# Patient Record
Sex: MALE | Race: WHITE | Employment: OTHER | URBAN - METROPOLITAN AREA
[De-identification: names, ages, dates, MRNs, and addresses within clinical notes are randomized per-mention and may not be internally consistent; named-entity substitution may affect disease eponyms.]

---

## 2017-01-20 ENCOUNTER — TELEPHONE (OUTPATIENT)
Dept: RHEUMATOLOGY | Facility: CLINIC | Age: 57
End: 2017-01-20

## 2017-01-20 NOTE — TELEPHONE ENCOUNTER
Pt stts he dropped off forms in the office for the Doc to fill out   Pt stts it is for his insurance company [employer] has not received those forms as of yet   Pt would like a call back to further discuss, forms were dropped in Dec

## 2017-01-23 ENCOUNTER — TELEPHONE (OUTPATIENT)
Dept: ADMINISTRATIVE | Age: 57
End: 2017-01-23

## 2017-01-23 NOTE — TELEPHONE ENCOUNTER
Dr. Dorita Randall,  Pt has dropped off long term disability forms. May I please extend his disability X 12 more months?   Please advise, thanks Ophelia

## 2017-02-16 ENCOUNTER — TELEPHONE (OUTPATIENT)
Dept: RHEUMATOLOGY | Facility: CLINIC | Age: 57
End: 2017-02-16

## 2017-02-16 DIAGNOSIS — M05.79 SEROPOSITIVE RHEUMATOID ARTHRITIS OF MULTIPLE SITES (HCC): Primary | ICD-10-CM

## 2017-02-16 DIAGNOSIS — Z51.81 ENCOUNTER FOR THERAPEUTIC DRUG MONITORING: ICD-10-CM

## 2017-02-16 NOTE — TELEPHONE ENCOUNTER
Per pt, he is planning to go and have blood work this wkend but the Order on file will be ,  Pls advise.

## 2017-03-22 ENCOUNTER — OFFICE VISIT (OUTPATIENT)
Dept: RHEUMATOLOGY | Facility: CLINIC | Age: 57
End: 2017-03-22

## 2017-03-22 VITALS
HEIGHT: 70 IN | BODY MASS INDEX: 31.87 KG/M2 | DIASTOLIC BLOOD PRESSURE: 80 MMHG | SYSTOLIC BLOOD PRESSURE: 127 MMHG | HEART RATE: 71 BPM | WEIGHT: 222.63 LBS

## 2017-03-22 DIAGNOSIS — Z51.81 THERAPEUTIC DRUG MONITORING: ICD-10-CM

## 2017-03-22 DIAGNOSIS — M05.79 SEROPOSITIVE RHEUMATOID ARTHRITIS OF MULTIPLE SITES (HCC): Primary | ICD-10-CM

## 2017-03-22 PROCEDURE — G0463 HOSPITAL OUTPT CLINIC VISIT: HCPCS | Performed by: INTERNAL MEDICINE

## 2017-03-22 PROCEDURE — 99214 OFFICE O/P EST MOD 30 MIN: CPT | Performed by: INTERNAL MEDICINE

## 2017-03-22 NOTE — PROGRESS NOTES
HPI:    Patient ID: Amadou Hussein is a 56year old male. HPI Comments: Julio Gonzales is a 53-year-old gentleman with CCP and RF positive rheumatoid arthritis.  Since I saw him 4 months ago he has continued methylprednisolone 4 mg daily, Naprosyn 500 mg once or twic 500 MG Oral Tab TAKE 1 TABLET TWICE A DAY  WITH FOOD (Patient taking differently: TAKE 1 TABLET DAILY) Disp: 180 tablet Rfl: 3   Coenzyme Q10 (CO Q 10 OR) Take 1 tablet by mouth daily.  Disp:  Rfl:    Cholecalciferol (VITAMIN D) 2000 UNITS Oral Cap Take  by Sig: TAKE 8 TABLETS EVERY WEEK           Imaging & Referrals:  None       VW#[id number]

## 2017-06-06 RX ORDER — NAPROXEN 500 MG/1
TABLET ORAL
Qty: 180 TABLET | Refills: 3 | Status: SHIPPED | OUTPATIENT
Start: 2017-06-06 | End: 2018-07-30 | Stop reason: ALTCHOICE

## 2017-06-06 RX ORDER — FOLIC ACID 1 MG/1
TABLET ORAL
Qty: 90 TABLET | Refills: 3 | Status: SHIPPED | OUTPATIENT
Start: 2017-06-06 | End: 2018-08-03

## 2017-06-06 NOTE — TELEPHONE ENCOUNTER
LOV: 3/22/17  Last Refilled:Naproxen#180, 3rfs 7/5/98 and Folic CDMJ#81, 3rfs 30/54/91  Labs:CRT 0.8 9/24/16      No future appointments. Please advise.

## 2017-08-02 ENCOUNTER — OFFICE VISIT (OUTPATIENT)
Dept: RHEUMATOLOGY | Facility: CLINIC | Age: 57
End: 2017-08-02

## 2017-08-02 VITALS
BODY MASS INDEX: 31.64 KG/M2 | SYSTOLIC BLOOD PRESSURE: 116 MMHG | HEART RATE: 73 BPM | HEIGHT: 70 IN | WEIGHT: 221 LBS | DIASTOLIC BLOOD PRESSURE: 75 MMHG

## 2017-08-02 DIAGNOSIS — M05.79 SEROPOSITIVE RHEUMATOID ARTHRITIS OF MULTIPLE SITES (HCC): Primary | ICD-10-CM

## 2017-08-02 DIAGNOSIS — Z51.81 THERAPEUTIC DRUG MONITORING: ICD-10-CM

## 2017-08-02 PROCEDURE — G0463 HOSPITAL OUTPT CLINIC VISIT: HCPCS | Performed by: INTERNAL MEDICINE

## 2017-08-02 PROCEDURE — 99214 OFFICE O/P EST MOD 30 MIN: CPT | Performed by: INTERNAL MEDICINE

## 2017-08-02 NOTE — PROGRESS NOTES
HPI:    Patient ID: Hortencia Berrios is a 56year old male. Joaquin Vilchis is a 61-year-old gentleman with CCP and RF positive rheumatoid arthritis.  Since I saw him 4 months ago he has continued methylprednisolone 4 mg daily, Naprosyn 500 mg once or twice a day, metho Take 1 tablet by mouth daily. Disp:  Rfl:    Coenzyme Q10 (CO Q 10 OR) Take 1 tablet by mouth daily. Disp:  Rfl:    Cholecalciferol (VITAMIN D) 2000 UNITS Oral Cap Take  by mouth. Take 1 tab.  Every day Disp:  Rfl:    POTASSIUM CITRATE OR Take 99 Mutually D methotrexate 2.5 MG Oral Tab 104 tablet 1      Sig: TAKE 8 TABLETS EVERY WEEK           Imaging & Referrals:  None       OO#[id number]

## 2017-11-06 RX ORDER — METHYLPREDNISOLONE 4 MG/1
TABLET ORAL
Qty: 90 TABLET | Refills: 3 | Status: SHIPPED | OUTPATIENT
Start: 2017-11-06 | End: 2018-07-30

## 2017-11-20 NOTE — PROGRESS NOTES
Trixie Singh is a 72-year-old gentleman with CCP and RF positive rheumatoid arthritis.  Since I saw him almost 4 months ago, he has continued methylprednisolone 4 mg daily, Naprosyn 500 mg once or twice a day, methotrexate 20 mg weekly, and hydroxychloroquine 400 m 180 tablet Rfl: 3   methylPREDNISolone 4 MG Oral Tab Take one daily. Disp: 90 tablet Rfl: 3   Olmesartan Medoxomil-HCTZ 40-25 MG Oral Tab Take 1 tablet by mouth daily. Disp:  Rfl:    Coenzyme Q10 (CO Q 10 OR) Take 1 tablet by mouth daily.  Disp:  Rfl:    Ch a biologic agent. He is phobic of them. 2. Encounter for therapeutic drug monitoring. 3.  Flare of right shoulder rotator cuff tendinitis.   After informed consent was obtained, his right shoulder was injected into the subacromial area with 40 mg of Dep

## 2017-11-21 ENCOUNTER — OFFICE VISIT (OUTPATIENT)
Dept: RHEUMATOLOGY | Facility: CLINIC | Age: 57
End: 2017-11-21

## 2017-11-21 VITALS
BODY MASS INDEX: 32.18 KG/M2 | TEMPERATURE: 98 F | WEIGHT: 224.81 LBS | HEART RATE: 61 BPM | HEIGHT: 70 IN | SYSTOLIC BLOOD PRESSURE: 154 MMHG | DIASTOLIC BLOOD PRESSURE: 98 MMHG

## 2017-11-21 DIAGNOSIS — M05.79 SEROPOSITIVE RHEUMATOID ARTHRITIS OF MULTIPLE SITES (HCC): Primary | ICD-10-CM

## 2017-11-21 DIAGNOSIS — M67.911 DISORDER OF RIGHT ROTATOR CUFF: ICD-10-CM

## 2017-11-21 DIAGNOSIS — Z51.81 THERAPEUTIC DRUG MONITORING: ICD-10-CM

## 2017-11-21 PROCEDURE — 99214 OFFICE O/P EST MOD 30 MIN: CPT | Performed by: INTERNAL MEDICINE

## 2017-11-21 PROCEDURE — 20610 DRAIN/INJ JOINT/BURSA W/O US: CPT | Performed by: INTERNAL MEDICINE

## 2017-11-21 RX ORDER — METHYLPREDNISOLONE ACETATE 40 MG/ML
40 INJECTION, SUSPENSION INTRA-ARTICULAR; INTRALESIONAL; INTRAMUSCULAR; SOFT TISSUE ONCE
Status: COMPLETED | OUTPATIENT
Start: 2017-11-21 | End: 2017-11-21

## 2017-11-21 RX ORDER — HYDROXYCHLOROQUINE SULFATE 200 MG/1
400 TABLET, FILM COATED ORAL
Qty: 180 TABLET | Refills: 3 | Status: SHIPPED | OUTPATIENT
Start: 2017-11-21 | End: 2018-10-09

## 2017-11-21 NOTE — PROCEDURES
Joint Injection  Pre-procedure care:  Consent was obtained, Procedure/risks were explained, Questions were answered, Patient was prepped and draped in the usual sterile fashion, Correct side and site confirmed and Correct patient identified  Seropositive r

## 2017-11-28 ENCOUNTER — TELEPHONE (OUTPATIENT)
Dept: RHEUMATOLOGY | Facility: CLINIC | Age: 57
End: 2017-11-28

## 2017-11-28 ENCOUNTER — PATIENT MESSAGE (OUTPATIENT)
Dept: RHEUMATOLOGY | Facility: CLINIC | Age: 57
End: 2017-11-28

## 2017-11-28 RX ORDER — METHYLPREDNISOLONE 4 MG/1
TABLET ORAL
Qty: 1 PACKAGE | Refills: 0 | Status: SHIPPED | OUTPATIENT
Start: 2017-11-28 | End: 2018-04-11

## 2017-11-28 RX ORDER — FOLIC ACID 1 MG/1
TABLET ORAL
Qty: 90 TABLET | Refills: 3 | OUTPATIENT
Start: 2017-11-28

## 2017-11-28 NOTE — TELEPHONE ENCOUNTER
From: Rhett Torres  To: Key Lott MD  Sent: 11/28/2017 6:31 AM CST  Subject: Non-Urgent Medical Question    Good morning, still having pain in right shoulder. Do I have any other options.  Thanks Dannie Morse

## 2017-11-28 NOTE — TELEPHONE ENCOUNTER
AKB:88-09  Last Filled:6-6-17, #90 with 3 refills  Labs:  No future appointments. [facility] and spoke to Lali Caraballo, patient has refills left.   Disregard request.

## 2017-11-28 NOTE — TELEPHONE ENCOUNTER
Phoned patient regarding a refill request and he reports pain to his right shoulder. Pain is at 9 and it radiates up to his neck and down the arm. The injection Dr. Mary Reilly gave him did not help. He wants to know what he can do for the pain? Please advise.

## 2017-11-28 NOTE — TELEPHONE ENCOUNTER
I discussed this with Tanya Stokes. Unfortunately, his shoulder is no better after the injection 1 week ago. It makes it hard to sleep. I sent in a prescription for Medrol Dosepak.   If he is not better after a few days, he will come in for the right shoulder

## 2018-01-02 ENCOUNTER — TELEPHONE (OUTPATIENT)
Dept: RHEUMATOLOGY | Facility: CLINIC | Age: 58
End: 2018-01-02

## 2018-01-02 NOTE — TELEPHONE ENCOUNTER
Pt stop by Renetta Isaacs office requesting to have [facility] forms filled out & have copies mailed to him. Form was placed in Dr. Dee Dee Ramirez. Please advise.

## 2018-01-03 NOTE — TELEPHONE ENCOUNTER
Dr. Antony Rod,    Please sign off on form:  -Highlight patient  -Hit Chart button  -Open Telephone encounter 1/2/18  -Scroll to [facility] Disab Dr. Antony Rod [birth year] and click to open image.  -Hit ACKNOWLEDGE button at the bottom right corner and l

## 2018-01-03 NOTE — TELEPHONE ENCOUNTER
Dr. Shawna Kirkland,  Pt has dropped off long term disability forms. May I please extend his disability X 12 more months? Please advise.     Thank you,  [facility]

## 2018-01-03 NOTE — TELEPHONE ENCOUNTER
Disability form for Dr. Sher Ge received in [employer]. Logged for processing. [employer] packet emailed to pt [email or web address]).  NK

## 2018-02-01 ENCOUNTER — TELEPHONE (OUTPATIENT)
Dept: RHEUMATOLOGY | Facility: CLINIC | Age: 58
End: 2018-02-01

## 2018-02-01 DIAGNOSIS — M25.511 CHRONIC RIGHT SHOULDER PAIN: Primary | ICD-10-CM

## 2018-02-01 DIAGNOSIS — G89.29 CHRONIC RIGHT SHOULDER PAIN: Primary | ICD-10-CM

## 2018-02-02 ENCOUNTER — HOSPITAL ENCOUNTER (OUTPATIENT)
Dept: GENERAL RADIOLOGY | Age: 58
Discharge: HOME | End: 2018-02-02
Attending: INTERNAL MEDICINE
Payer: MEDICARE

## 2018-02-02 DIAGNOSIS — M25.511 CHRONIC RIGHT SHOULDER PAIN: ICD-10-CM

## 2018-02-02 DIAGNOSIS — G89.29 CHRONIC RIGHT SHOULDER PAIN: ICD-10-CM

## 2018-02-02 PROCEDURE — 73030 X-RAY EXAM OF SHOULDER: CPT | Performed by: INTERNAL MEDICINE

## 2018-02-06 ENCOUNTER — TELEPHONE (OUTPATIENT)
Dept: RHEUMATOLOGY | Facility: CLINIC | Age: 58
End: 2018-02-06

## 2018-02-06 DIAGNOSIS — M25.511 CHRONIC RIGHT SHOULDER PAIN: Primary | ICD-10-CM

## 2018-02-06 DIAGNOSIS — G89.29 CHRONIC RIGHT SHOULDER PAIN: Primary | ICD-10-CM

## 2018-02-06 NOTE — TELEPHONE ENCOUNTER
Please let him know that his main right shoulder joint, the glenohumeral joint is normal.    There is significant osteoarthritis, with spurring, in his acromioclavicular joint, where the collarbone meets the shoulder.   The spurs can irritate the tendons, a

## 2018-02-07 NOTE — TELEPHONE ENCOUNTER
Pt aware of results and verbalized understanding. Asking if there is something he can do for the pain? Started to suggest Tylenol but pt stated he already takes Naproxen 1,000 mg daily. Please advise.  Pt stated he will have labs completed soon and schedule

## 2018-02-07 NOTE — TELEPHONE ENCOUNTER
I recommend physical therapy. I injected the shoulder at his last visit. If agreeable, please order physical therapy for 12 visits. Thanks.

## 2018-03-07 ENCOUNTER — TELEPHONE (OUTPATIENT)
Dept: RHEUMATOLOGY | Facility: CLINIC | Age: 58
End: 2018-03-07

## 2018-03-07 DIAGNOSIS — Z51.81 ENCOUNTER FOR THERAPEUTIC DRUG MONITORING: ICD-10-CM

## 2018-03-07 DIAGNOSIS — M05.79 SEROPOSITIVE RHEUMATOID ARTHRITIS OF MULTIPLE SITES (HCC): Primary | ICD-10-CM

## 2018-03-07 NOTE — TELEPHONE ENCOUNTER
Received a fax from [id number] [address] at [facility] for a new SORD. SORD made and faxed to [phone]. Confirmation received.

## 2018-03-19 ENCOUNTER — TELEPHONE (OUTPATIENT)
Dept: RHEUMATOLOGY | Facility: CLINIC | Age: 58
End: 2018-03-19

## 2018-03-19 NOTE — TELEPHONE ENCOUNTER
Brent Villanueva would like to speak with the RN to confirm if the patient's test results were received from them.

## 2018-03-19 NOTE — TELEPHONE ENCOUNTER
TRAY Marie to call back, checking to see if she faxed results to [phone] for Dr. Taylor Aguilera.

## 2018-04-10 NOTE — PROGRESS NOTES
Day Kirkland is a 46-year-old gentleman with CCP and RF positive rheumatoid arthritis.  Since I saw him almost 4 months ago, he has continued methylprednisolone 4 mg daily, Naprosyn 500 mg once or twice a day, methotrexate 20 mg weekly, and hydroxychloroquine 400 m TABLETS BY MOUTH EVERY DAY Disp: 180 tablet Rfl: 3   methylPREDNISolone 4 MG Oral Tab Take one daily. Disp: 90 tablet Rfl: 3   Olmesartan Medoxomil-HCTZ 40-25 MG Oral Tab Take 1 tablet by mouth daily.  Disp:  Rfl:    Coenzyme Q10 (CO Q 10 OR) Take 1 tablet tolerate his immunosuppressants well. He will continue every 3 month monitoring. He continues to not want to start a biologic agent. He is phobic of them. 2. Encounter for therapeutic drug monitoring.    3.  Right shoulder rotator cuff impingement tendini

## 2018-04-11 ENCOUNTER — OFFICE VISIT (OUTPATIENT)
Dept: RHEUMATOLOGY | Facility: CLINIC | Age: 58
End: 2018-04-11

## 2018-04-11 VITALS
TEMPERATURE: 98 F | HEART RATE: 69 BPM | DIASTOLIC BLOOD PRESSURE: 84 MMHG | SYSTOLIC BLOOD PRESSURE: 151 MMHG | WEIGHT: 220.06 LBS | HEIGHT: 70 IN | BODY MASS INDEX: 31.5 KG/M2

## 2018-04-11 DIAGNOSIS — M05.79 SEROPOSITIVE RHEUMATOID ARTHRITIS OF MULTIPLE SITES (HCC): Primary | ICD-10-CM

## 2018-04-11 DIAGNOSIS — M67.911 DISORDER OF RIGHT ROTATOR CUFF: ICD-10-CM

## 2018-04-11 DIAGNOSIS — Z51.81 THERAPEUTIC DRUG MONITORING: ICD-10-CM

## 2018-04-11 PROCEDURE — G0463 HOSPITAL OUTPT CLINIC VISIT: HCPCS | Performed by: INTERNAL MEDICINE

## 2018-04-11 PROCEDURE — 99214 OFFICE O/P EST MOD 30 MIN: CPT | Performed by: INTERNAL MEDICINE

## 2018-06-15 ENCOUNTER — TELEPHONE (OUTPATIENT)
Dept: RHEUMATOLOGY | Facility: CLINIC | Age: 58
End: 2018-06-15

## 2018-07-14 ENCOUNTER — PATIENT MESSAGE (OUTPATIENT)
Dept: RHEUMATOLOGY | Facility: CLINIC | Age: 58
End: 2018-07-14

## 2018-07-16 NOTE — TELEPHONE ENCOUNTER
From: Molly Schulte  To: Charna Heimlich, MD  Sent: 7/14/2018 8:28 AM CDT  Subject: Non-Urgent Gillie Creighton Dr Warner Merlin, I am scheduling surgury for a fistula. Once I get this taken care for I will be in for my check up.  Thanks any questions

## 2018-07-18 PROBLEM — N30.01 ACUTE CYSTITIS WITH HEMATURIA: Status: ACTIVE | Noted: 2018-07-18

## 2018-07-18 PROBLEM — N32.1 COLOVESICAL FISTULA: Status: ACTIVE | Noted: 2018-07-18

## 2018-07-27 ENCOUNTER — TELEPHONE (OUTPATIENT)
Dept: RHEUMATOLOGY | Facility: CLINIC | Age: 58
End: 2018-07-27

## 2018-07-27 PROBLEM — K57.20 DIVERTICULITIS OF LARGE INTESTINE WITH PERFORATION WITHOUT BLEEDING: Status: ACTIVE | Noted: 2018-07-27

## 2018-07-27 PROBLEM — D84.9 IMMUNOSUPPRESSION (HCC): Status: ACTIVE | Noted: 2018-07-27

## 2018-07-27 NOTE — TELEPHONE ENCOUNTER
Dr. Abby Baird general surgery would like to discuss treatment plan with DC for pt. RA needs to be addressed prior to GI surgery. Per Dr. Abby Baird, pt will need to be off Naprosyn, would like to substitute celebrex in place of.   Also will need to do a bowel prep

## 2018-07-27 NOTE — TELEPHONE ENCOUNTER
I discussed Henrik's case with Dr. Kirit Salinas. If he needs it, it would be fine to go to a Medrol 4 mg twice a day for awhile before his surgery. It is fine to switch him from naproxen to Celebrex 200 mg once a day.     His methotrexate is being held, barby

## 2018-07-30 RX ORDER — METHYLPREDNISOLONE 4 MG/1
4 TABLET ORAL 2 TIMES DAILY
Qty: 60 TABLET | Refills: 0 | Status: SHIPPED | OUTPATIENT
Start: 2018-07-30 | End: 2018-10-09

## 2018-07-30 RX ORDER — CELECOXIB 200 MG/1
200 CAPSULE ORAL DAILY
Qty: 30 CAPSULE | Refills: 2 | Status: SHIPPED | OUTPATIENT
Start: 2018-07-30 | End: 2018-08-21

## 2018-07-30 NOTE — TELEPHONE ENCOUNTER
Pt is currently taking 4 mg daily of medrol, rx pended for twice daily. Rx for celebrex pended for signature. Pt notified of changes with understanding. DC please advise on rx's. Cara Conn

## 2018-07-31 ENCOUNTER — TELEPHONE (OUTPATIENT)
Dept: RHEUMATOLOGY | Facility: CLINIC | Age: 58
End: 2018-07-31

## 2018-07-31 NOTE — TELEPHONE ENCOUNTER
Patient calling regarding medications that were not called into the pharmacy yesterday.        Please advise

## 2018-08-01 NOTE — TELEPHONE ENCOUNTER
Called and spoke with pt, states the medication was sent to [facility] mail order by mistake, was supposed to be local pharmacy. I offered to switch to local pharmacy but pt states he already has it taken care of at this time.  I informed him if he needs anything

## 2018-08-06 RX ORDER — FOLIC ACID 1 MG/1
TABLET ORAL
Qty: 90 TABLET | Refills: 3 | Status: SHIPPED | OUTPATIENT
Start: 2018-08-06 | End: 2019-07-21

## 2018-08-14 PROCEDURE — 88305 TISSUE EXAM BY PATHOLOGIST: CPT | Performed by: COLON & RECTAL SURGERY

## 2018-08-20 ENCOUNTER — PATIENT MESSAGE (OUTPATIENT)
Dept: RHEUMATOLOGY | Facility: CLINIC | Age: 58
End: 2018-08-20

## 2018-08-21 RX ORDER — CELECOXIB 200 MG/1
200 CAPSULE ORAL 2 TIMES DAILY
Qty: 180 CAPSULE | Refills: 0 | Status: SHIPPED | OUTPATIENT
Start: 2018-08-21 | End: 2018-10-09

## 2018-08-21 NOTE — TELEPHONE ENCOUNTER
From: Hortencia Berrios  To: Francisco Fernandez MD  Sent: 8/20/2018 2:56 PM CDT  Subject: Prescription Question    Would it be possible to stay on the celebrex and discontinue the naproxen?  I have been taking the 200 mg twice a day and getting better relief fro

## 2018-09-27 ENCOUNTER — PATIENT MESSAGE (OUTPATIENT)
Dept: RHEUMATOLOGY | Facility: CLINIC | Age: 58
End: 2018-09-27

## 2018-09-27 NOTE — TELEPHONE ENCOUNTER
From: Krystian Gannon  To: Zoë Deshpande MD  Sent: 9/27/2018 8:24 AM CDT  Subject: Non-Urgent Medical Question    Just want to make sure you got my lab results.

## 2018-10-08 NOTE — PROGRESS NOTES
Walker Malhotra is a 42-year-old gentleman with CCP and RF positive rheumatoid arthritis.  Since I saw him April 11th of 2018, he had major surgery on September 15, 2018 at [facility].  He had an abscess on the side of his colon that broke through and hem 3   methylPREDNISolone 4 MG Oral Tab Take one daily. Disp: 90 tablet Rfl: 3   Olmesartan Medoxomil-HCTZ 40-25 MG Oral Tab Take 1 tablet by mouth daily. Disp:  Rfl:    Coenzyme Q10 (CO Q 10 OR) Take 1 tablet by mouth daily.  Disp:  Rfl:    Cholecalciferol (V methotrexate after his successful surgery, so his RA should come under control again in a month. For now, I aspirated about 5 cc of yellow cloudy fluid from both knees. Both were injected with 40 mg of Depo-Medrol and 1 cc 1% lidocaine.   They were tolera

## 2018-10-09 ENCOUNTER — OFFICE VISIT (OUTPATIENT)
Dept: RHEUMATOLOGY | Facility: CLINIC | Age: 58
End: 2018-10-09
Payer: MEDICARE

## 2018-10-09 VITALS
BODY MASS INDEX: 28.92 KG/M2 | SYSTOLIC BLOOD PRESSURE: 153 MMHG | DIASTOLIC BLOOD PRESSURE: 92 MMHG | HEART RATE: 70 BPM | WEIGHT: 202 LBS | HEIGHT: 70 IN

## 2018-10-09 DIAGNOSIS — M25.462 BILATERAL KNEE EFFUSIONS: ICD-10-CM

## 2018-10-09 DIAGNOSIS — M05.79 SEROPOSITIVE RHEUMATOID ARTHRITIS OF MULTIPLE SITES (HCC): Primary | ICD-10-CM

## 2018-10-09 DIAGNOSIS — M25.461 BILATERAL KNEE EFFUSIONS: ICD-10-CM

## 2018-10-09 DIAGNOSIS — Z51.81 THERAPEUTIC DRUG MONITORING: ICD-10-CM

## 2018-10-09 PROCEDURE — 99214 OFFICE O/P EST MOD 30 MIN: CPT | Performed by: INTERNAL MEDICINE

## 2018-10-09 PROCEDURE — G0463 HOSPITAL OUTPT CLINIC VISIT: HCPCS | Performed by: INTERNAL MEDICINE

## 2018-10-09 PROCEDURE — 20610 DRAIN/INJ JOINT/BURSA W/O US: CPT | Performed by: INTERNAL MEDICINE

## 2018-10-09 RX ORDER — NAPROXEN 375 MG/1
375 TABLET ORAL 2 TIMES DAILY WITH MEALS
COMMUNITY
End: 2018-10-09

## 2018-10-09 RX ORDER — HYDROXYCHLOROQUINE SULFATE 200 MG/1
400 TABLET, FILM COATED ORAL
Qty: 180 TABLET | Refills: 3 | Status: SHIPPED | OUTPATIENT
Start: 2018-10-09 | End: 2019-02-05

## 2018-10-09 RX ORDER — NAPROXEN 500 MG/1
TABLET ORAL
Qty: 180 TABLET | Refills: 3 | COMMUNITY
Start: 2018-10-09 | End: 2018-12-28

## 2018-10-09 RX ORDER — METHYLPREDNISOLONE ACETATE 40 MG/ML
40 INJECTION, SUSPENSION INTRA-ARTICULAR; INTRALESIONAL; INTRAMUSCULAR; SOFT TISSUE ONCE
Status: COMPLETED | OUTPATIENT
Start: 2018-10-09 | End: 2018-10-09

## 2018-10-09 RX ORDER — METHYLPREDNISOLONE 4 MG/1
TABLET ORAL
Qty: 180 TABLET | Refills: 3 | Status: SHIPPED | OUTPATIENT
Start: 2018-10-09 | End: 2019-09-20

## 2018-10-24 RX ORDER — METHYLPREDNISOLONE 4 MG/1
TABLET ORAL
Qty: 90 TABLET | Refills: 3 | OUTPATIENT
Start: 2018-10-24

## 2018-11-19 RX ORDER — HYDROXYCHLOROQUINE SULFATE 200 MG/1
400 TABLET, FILM COATED ORAL
Qty: 180 TABLET | Refills: 3 | Status: SHIPPED | OUTPATIENT
Start: 2018-11-19 | End: 2020-01-15

## 2018-11-19 NOTE — TELEPHONE ENCOUNTER
LOV: 10/9/18  No future appointments. Labs: Labs were repeated September 24, 2018 at [facility].  CBC was normal except white count of 12,400. Sed rate was normal at 16. C-reactive protein was high at 21.2 (0-10).   AST, albumin, and creatin

## 2018-12-28 ENCOUNTER — TELEPHONE (OUTPATIENT)
Dept: RHEUMATOLOGY | Facility: CLINIC | Age: 58
End: 2018-12-28

## 2018-12-28 RX ORDER — METHYLPREDNISOLONE 4 MG/1
TABLET ORAL
Refills: 0 | OUTPATIENT
Start: 2018-12-28

## 2018-12-28 RX ORDER — NAPROXEN 500 MG/1
TABLET ORAL
Qty: 180 TABLET | Refills: 3 | Status: SHIPPED | OUTPATIENT
Start: 2018-12-28 | End: 2019-12-12

## 2018-12-28 RX ORDER — METHYLPREDNISOLONE 4 MG/1
TABLET ORAL
Qty: 1 PACKAGE | Refills: 0 | Status: SHIPPED | OUTPATIENT
Start: 2018-12-28 | End: 2020-01-15

## 2018-12-28 NOTE — TELEPHONE ENCOUNTER
Pt is calling because he needs refills on medication. Pt stts that somehow all of prescriptions has been removed since he had surgery this summer. Pt would like naproxen to be sent to mail order pharmacy on file.      Current Outpatient Medications:

## 2018-12-28 NOTE — TELEPHONE ENCOUNTER
LOV: 10/9/18  Please advise on request for Medrol Dose Vito to local pharmacy. No future appointments.   Labs: 12/8/18 inflammatory markers elevated, kidney function WNL

## 2019-01-02 ENCOUNTER — TELEPHONE (OUTPATIENT)
Dept: ADMINISTRATIVE | Age: 59
End: 2019-01-02

## 2019-01-03 NOTE — TELEPHONE ENCOUNTER
[facility] Disability form for Dr. Bladimir Richter received in [employer] Signed release, paid $25 w/ . Logged for processing. NK    Mail patient copy.  SC

## 2019-02-04 NOTE — PROGRESS NOTES
Delfina Olea is a 43-year-old gentleman with CCP and RF positive rheumatoid arthritis. Since I saw him November 21st of 2018, he has remained on methotrexate 20 mg weekly, methylprednisolone 4 mg twice a day, and hydroxychloroquine 400 mg a day.     His arthritis h Soft. Bowel sounds are normal. He exhibits no mass. There is no tenderness. There is no rebound and no guarding. Musculoskeletal: He exhibits no edema. There is enlargement of his wrist ulnar styloid processes, right greater than left.   There is synovi

## 2019-02-05 ENCOUNTER — OFFICE VISIT (OUTPATIENT)
Dept: RHEUMATOLOGY | Facility: CLINIC | Age: 59
End: 2019-02-05
Payer: MEDICARE

## 2019-02-05 VITALS
SYSTOLIC BLOOD PRESSURE: 129 MMHG | BODY MASS INDEX: 30.06 KG/M2 | DIASTOLIC BLOOD PRESSURE: 88 MMHG | HEIGHT: 70 IN | HEART RATE: 84 BPM | WEIGHT: 210 LBS

## 2019-02-05 DIAGNOSIS — Z51.81 THERAPEUTIC DRUG MONITORING: ICD-10-CM

## 2019-02-05 DIAGNOSIS — M05.79 SEROPOSITIVE RHEUMATOID ARTHRITIS OF MULTIPLE SITES (HCC): Primary | ICD-10-CM

## 2019-02-05 PROCEDURE — G0463 HOSPITAL OUTPT CLINIC VISIT: HCPCS | Performed by: INTERNAL MEDICINE

## 2019-02-05 PROCEDURE — 99214 OFFICE O/P EST MOD 30 MIN: CPT | Performed by: INTERNAL MEDICINE

## 2019-02-20 NOTE — TELEPHONE ENCOUNTER
Dr. Staci Prieto,     Please sign off on form:  -Highlight the patient and hit \"Chart\" button. -In Chart Review, w/in the Encounter tab - click 1 time on the Telephone call encounter for 1/2/19.  Scroll down the telephone encounter.  -Click \"scan on\" blue

## 2019-03-20 NOTE — TELEPHONE ENCOUNTER
Requested medication: METHOTREXATE 2.5 MG Oral Tab  Last refilled: 10/9/18 #104 tab with 1 refill  LOV: 2/5/19  No future appointments. Labs:  3/7/18      Assessment and Plan:  1. CCP and RF positive Ra (rheumatoid arthritis).  It flared diffusely after st

## 2019-04-17 ENCOUNTER — TELEPHONE (OUTPATIENT)
Dept: RHEUMATOLOGY | Facility: CLINIC | Age: 59
End: 2019-04-17

## 2019-04-17 NOTE — TELEPHONE ENCOUNTER
LM informing lab orders have been mailed to address on file. Fax # included. Pt to call back with any questions.

## 2019-04-23 ENCOUNTER — TELEPHONE (OUTPATIENT)
Dept: RHEUMATOLOGY | Facility: CLINIC | Age: 59
End: 2019-04-23

## 2019-04-23 NOTE — TELEPHONE ENCOUNTER
Patient states lab is requesting MD signature on Standing Lab Order for outside lab. MD signed order and I faxed it to [facility] [facility] [phone]. Confirmation received. Phone number [phone].

## 2019-04-30 ENCOUNTER — OFFICE VISIT (OUTPATIENT)
Dept: RHEUMATOLOGY | Facility: CLINIC | Age: 59
End: 2019-04-30
Payer: MEDICARE

## 2019-04-30 VITALS
DIASTOLIC BLOOD PRESSURE: 87 MMHG | BODY MASS INDEX: 30.64 KG/M2 | HEIGHT: 70 IN | HEART RATE: 75 BPM | SYSTOLIC BLOOD PRESSURE: 133 MMHG | WEIGHT: 214 LBS

## 2019-04-30 DIAGNOSIS — M05.79 SEROPOSITIVE RHEUMATOID ARTHRITIS OF MULTIPLE SITES (HCC): Primary | ICD-10-CM

## 2019-04-30 DIAGNOSIS — M25.461 BILATERAL KNEE EFFUSIONS: ICD-10-CM

## 2019-04-30 DIAGNOSIS — M25.462 BILATERAL KNEE EFFUSIONS: ICD-10-CM

## 2019-04-30 DIAGNOSIS — Z51.81 THERAPEUTIC DRUG MONITORING: ICD-10-CM

## 2019-04-30 PROCEDURE — 20610 DRAIN/INJ JOINT/BURSA W/O US: CPT | Performed by: INTERNAL MEDICINE

## 2019-04-30 PROCEDURE — 99214 OFFICE O/P EST MOD 30 MIN: CPT | Performed by: INTERNAL MEDICINE

## 2019-04-30 RX ORDER — METHYLPREDNISOLONE ACETATE 40 MG/ML
40 INJECTION, SUSPENSION INTRA-ARTICULAR; INTRALESIONAL; INTRAMUSCULAR; SOFT TISSUE
Status: SHIPPED | OUTPATIENT
Start: 2019-04-30 | End: 2019-04-30

## 2019-04-30 NOTE — PROGRESS NOTES
Barb Reid is a 61-year-old gentleman with CCP and RF positive rheumatoid arthritis.  Since I saw him February 5th of 2019, he has remained on methotrexate 20 mg weekly, methylprednisolone 4 mg in the morning and 2mg in the evening, and hydroxychloroquine 400 mg breath sounds normal.   Abdominal: Soft. Bowel sounds are normal. He exhibits no mass. There is no tenderness. There is no rebound and no guarding. Musculoskeletal: He exhibits no edema.    There is enlargement of his wrist ulnar styloid processes, right right knee was slightly bloody.

## 2019-07-22 RX ORDER — FOLIC ACID 1 MG/1
TABLET ORAL
Qty: 90 TABLET | Refills: 3 | Status: SHIPPED | OUTPATIENT
Start: 2019-07-22 | End: 2020-07-16

## 2019-07-22 NOTE — TELEPHONE ENCOUNTER
LOV: 4-30-19  Last Refilled:#90, 3rfs 8/6/18    Future Appointments   Date Time Provider Mariia Cristina   8/2/2019  1:30 PM DO AMINA Lima       Please advise.

## 2019-09-03 ENCOUNTER — OFFICE VISIT (OUTPATIENT)
Dept: RHEUMATOLOGY | Facility: CLINIC | Age: 59
End: 2019-09-03
Payer: MEDICARE

## 2019-09-03 VITALS
BODY MASS INDEX: 29.49 KG/M2 | HEIGHT: 70 IN | SYSTOLIC BLOOD PRESSURE: 127 MMHG | DIASTOLIC BLOOD PRESSURE: 83 MMHG | HEART RATE: 78 BPM | WEIGHT: 206 LBS

## 2019-09-03 DIAGNOSIS — Z51.81 THERAPEUTIC DRUG MONITORING: ICD-10-CM

## 2019-09-03 DIAGNOSIS — M05.79 SEROPOSITIVE RHEUMATOID ARTHRITIS OF MULTIPLE SITES (HCC): Primary | ICD-10-CM

## 2019-09-03 DIAGNOSIS — M25.462 KNEE EFFUSION, LEFT: ICD-10-CM

## 2019-09-03 PROCEDURE — 99214 OFFICE O/P EST MOD 30 MIN: CPT | Performed by: INTERNAL MEDICINE

## 2019-09-03 PROCEDURE — G0463 HOSPITAL OUTPT CLINIC VISIT: HCPCS | Performed by: INTERNAL MEDICINE

## 2019-09-03 PROCEDURE — 20610 DRAIN/INJ JOINT/BURSA W/O US: CPT | Performed by: INTERNAL MEDICINE

## 2019-09-03 RX ORDER — OLMESARTAN MEDOXOMIL 40 MG/1
40 TABLET ORAL DAILY
COMMUNITY
Start: 2019-08-28

## 2019-09-03 RX ORDER — METHYLPREDNISOLONE ACETATE 40 MG/ML
40 INJECTION, SUSPENSION INTRA-ARTICULAR; INTRALESIONAL; INTRAMUSCULAR; SOFT TISSUE ONCE
Status: COMPLETED | OUTPATIENT
Start: 2019-09-03 | End: 2019-09-03

## 2019-09-03 NOTE — PROGRESS NOTES
Morteza Godoy is a 82-year-old gentleman with CCP and RF positive rheumatoid arthritis. Since I saw him April 30th of 2019 of 2019, he has remained on methotrexate 20 mg weekly, methylprednisolone 4 mg in the morning, and hydroxychloroquine 400 mg a day.     Shanique Blevins normal.   Cardiovascular: Normal rate, regular rhythm and normal heart sounds. Pulmonary/Chest: Effort normal and breath sounds normal.   Abdominal: Soft. Bowel sounds are normal. He exhibits no mass. There is no tenderness.  There is no rebound and no g lidocaine.

## 2019-09-20 RX ORDER — METHYLPREDNISOLONE 4 MG/1
TABLET ORAL
Qty: 90 TABLET | Refills: 3 | Status: SHIPPED | OUTPATIENT
Start: 2019-09-20 | End: 2020-01-15

## 2019-11-26 RX ORDER — HYDROXYCHLOROQUINE SULFATE 200 MG/1
TABLET, FILM COATED ORAL
Qty: 180 TABLET | Refills: 3 | Status: SHIPPED | OUTPATIENT
Start: 2019-11-26 | End: 2021-07-07

## 2019-12-12 RX ORDER — NAPROXEN 500 MG/1
TABLET ORAL
Qty: 180 TABLET | Refills: 3 | Status: SHIPPED | OUTPATIENT
Start: 2019-12-12 | End: 2020-10-19

## 2019-12-12 NOTE — TELEPHONE ENCOUNTER
Requested Prescriptions     Pending Prescriptions Disp Refills   • naproxen 500 MG Oral Tab [Pharmacy Med Name: NAPROXEN TAB 500MG] 180 tablet 3     Sig: TAKE 1 TABLET TWICE A DAY  WITH FOOD      Last filled:12/18/18 #180 tab w/ 3 rf    LOV: 9/3/19   Yesi

## 2020-01-11 NOTE — PROGRESS NOTES
Qing Walden is a 16-year-old gentleman with CCP and RF positive rheumatoid arthritis. Since I saw him September 3rd of 2019, he has remained on methotrexate 20 mg weekly, methylprednisolone 4 mg in the morning, and hydroxychloroquine 400 mg a day.     His hands an normal.   Abdominal: Soft. Bowel sounds are normal. He exhibits no mass. There is no tenderness. There is no rebound and no guarding. Musculoskeletal: He exhibits no edema.    There is synovitis across his left greater than right  wrist.  strength is

## 2020-01-15 ENCOUNTER — TELEPHONE (OUTPATIENT)
Dept: RHEUMATOLOGY | Facility: CLINIC | Age: 60
End: 2020-01-15

## 2020-01-15 ENCOUNTER — OFFICE VISIT (OUTPATIENT)
Dept: RHEUMATOLOGY | Facility: CLINIC | Age: 60
End: 2020-01-15
Payer: MEDICARE

## 2020-01-15 VITALS
WEIGHT: 213 LBS | HEART RATE: 69 BPM | BODY MASS INDEX: 30.49 KG/M2 | SYSTOLIC BLOOD PRESSURE: 162 MMHG | HEIGHT: 70 IN | DIASTOLIC BLOOD PRESSURE: 93 MMHG

## 2020-01-15 DIAGNOSIS — M05.79 SEROPOSITIVE RHEUMATOID ARTHRITIS OF MULTIPLE SITES (HCC): Primary | ICD-10-CM

## 2020-01-15 DIAGNOSIS — Z51.81 THERAPEUTIC DRUG MONITORING: ICD-10-CM

## 2020-01-15 DIAGNOSIS — E55.9 VITAMIN D DEFICIENCY: ICD-10-CM

## 2020-01-15 PROCEDURE — G0463 HOSPITAL OUTPT CLINIC VISIT: HCPCS | Performed by: INTERNAL MEDICINE

## 2020-01-15 PROCEDURE — 99214 OFFICE O/P EST MOD 30 MIN: CPT | Performed by: INTERNAL MEDICINE

## 2020-01-15 RX ORDER — HYDROXYCHLOROQUINE SULFATE 200 MG/1
400 TABLET, FILM COATED ORAL
Qty: 180 TABLET | Refills: 3 | Status: SHIPPED | OUTPATIENT
Start: 2020-01-15 | End: 2021-01-14

## 2020-01-15 NOTE — TELEPHONE ENCOUNTER
Pt came to [facility] office for OV and gave nurse forms to be completed. Pt did not follow up with  and had not filled out HIPPA. Emailed to forms dept.

## 2020-01-16 NOTE — TELEPHONE ENCOUNTER
Pt came to [facility]  and filled out HIPPA and paid fee.  Pt states this is a yearly renewal.

## 2020-01-24 NOTE — TELEPHONE ENCOUNTER
Dr. Fady Rea    Please sign off on form:Disability   -Highlight the patient and hit \"Chart\" button. -In Chart Review, w/in the Encounter tab - click 1 time on the Telephone call encounter for 1/15/2020.  Scroll down the telephone encounter.  -Click \"sc

## 2020-04-09 NOTE — TELEPHONE ENCOUNTER
Requested Prescriptions     Pending Prescriptions Disp Refills   • METHOTREXATE 2.5 MG Oral Tab [Pharmacy Med Name: METHOTREXATE TAB 2.5MG] 104 tablet 1     Sig: TAKE 8 TABLETS EVERY       TUESDAY WITH DINNER     Last filled: #9/3/19 #104 tab w/ 1 rf  LOV:

## 2020-06-16 ENCOUNTER — TELEPHONE (OUTPATIENT)
Dept: RHEUMATOLOGY | Facility: CLINIC | Age: 60
End: 2020-06-16

## 2020-06-16 ENCOUNTER — PATIENT MESSAGE (OUTPATIENT)
Dept: RHEUMATOLOGY | Facility: CLINIC | Age: 60
End: 2020-06-16

## 2020-06-16 NOTE — TELEPHONE ENCOUNTER
Results are in [facility] Outside Records; Dr. Racquel Rodriguez cannot release records completed at another facility. Labs were completed through [facility]. Pt contacted and aware.

## 2020-06-16 NOTE — TELEPHONE ENCOUNTER
Pt states that he received a response in his my chart about his test results. Patient, states that it stated that the results were done in an outside lab and they could not be added to his my chart.  Per patient the doctor is the one that has to release the

## 2020-06-16 NOTE — TELEPHONE ENCOUNTER
From: Sara Rice  Sent: 6/16/2020 11:24 AM CDT  To: Gosia Danielle Clinical Staff  Subject: RE:Lab Results. Will do thanks.  Will the lab results be posted to my chart?  ----- Message -----  From: Andrew Moralez MD  Sent: 6/16/2020 10:30 AM CDT  To: Kaylie Elias

## 2020-07-04 NOTE — PROGRESS NOTES
Robinson Yanez is a 16-year-old gentleman with CCP and RF positive rheumatoid arthritis. Since I saw him January 15th of 2020, he has remained on methotrexate 20 mg weekly, methylprednisolone 4 mg in the morning, and hydroxychloroquine 400 mg a day.     His hands and breath sounds normal.   Abdominal: Soft. Bowel sounds are normal. He exhibits no mass. There is no tenderness. There is no rebound and no guarding. Musculoskeletal: He exhibits no edema.    There is synovitis across his left greater than right  wrist. Gri

## 2020-07-08 ENCOUNTER — OFFICE VISIT (OUTPATIENT)
Dept: RHEUMATOLOGY | Facility: CLINIC | Age: 60
End: 2020-07-08
Payer: MEDICARE

## 2020-07-08 VITALS
WEIGHT: 203 LBS | SYSTOLIC BLOOD PRESSURE: 149 MMHG | HEIGHT: 70 IN | BODY MASS INDEX: 29.06 KG/M2 | HEART RATE: 76 BPM | DIASTOLIC BLOOD PRESSURE: 87 MMHG

## 2020-07-08 DIAGNOSIS — Z51.81 THERAPEUTIC DRUG MONITORING: ICD-10-CM

## 2020-07-08 DIAGNOSIS — M05.79 SEROPOSITIVE RHEUMATOID ARTHRITIS OF MULTIPLE SITES (HCC): Primary | ICD-10-CM

## 2020-07-08 DIAGNOSIS — M66.0 BAKER'S CYST, RUPTURED: ICD-10-CM

## 2020-07-08 PROCEDURE — 99214 OFFICE O/P EST MOD 30 MIN: CPT | Performed by: INTERNAL MEDICINE

## 2020-07-08 PROCEDURE — G0463 HOSPITAL OUTPT CLINIC VISIT: HCPCS | Performed by: INTERNAL MEDICINE

## 2020-07-08 RX ORDER — B-COMPLEX WITH VITAMIN C
1 TABLET ORAL DAILY
COMMUNITY

## 2020-07-09 ENCOUNTER — PATIENT MESSAGE (OUTPATIENT)
Dept: RHEUMATOLOGY | Facility: CLINIC | Age: 60
End: 2020-07-09

## 2020-07-10 RX ORDER — METHYLPREDNISOLONE 4 MG/1
TABLET ORAL
Qty: 1 PACKAGE | Refills: 0 | Status: SHIPPED | OUTPATIENT
Start: 2020-07-10

## 2020-07-10 NOTE — TELEPHONE ENCOUNTER
From: Molly Schulte  To: Shweta Serrano MD  Sent: 7/9/2020 10:39 PM CDT  Subject: Prescription Question    I was wondering if you could call in medrol dose pack for me getting pain in my knee.  Thanks Ed Rinaldi   [phone]

## 2020-07-16 RX ORDER — FOLIC ACID 1 MG/1
TABLET ORAL
Qty: 90 TABLET | Refills: 3 | Status: SHIPPED | OUTPATIENT
Start: 2020-07-16 | End: 2021-06-09

## 2020-07-16 NOTE — TELEPHONE ENCOUNTER
Last filled: 7/22/19 #90 tab with 3 refills   LOV: 7/8/2020  No future appointments. Labs:     Assessment and Plan:  1. CCP and RF positive Ra (rheumatoid arthritis).  It flared diffusely after stopping methotrexate before and after his major surgery in [place]

## 2020-08-06 ENCOUNTER — PATIENT MESSAGE (OUTPATIENT)
Dept: RHEUMATOLOGY | Facility: CLINIC | Age: 60
End: 2020-08-06

## 2020-08-06 NOTE — TELEPHONE ENCOUNTER
Last office visit 7/8/2020    Assessment and Plan:  1. CCP and RF positive Ra (rheumatoid arthritis). It flared diffusely after stopping methotrexate before and after his major surgery in September of 2018.   He is back on methotrexate, and it is bett

## 2020-08-06 NOTE — TELEPHONE ENCOUNTER
Patient calling to check the status of the message he sent Dr. Rosa Cosme pain in left hip       Please advise   [phone]

## 2020-08-06 NOTE — TELEPHONE ENCOUNTER
Contacted and offered patient appointment for 4:30 pm tomorrow at [facility] Pending [facility] to [facility] for further evaluation. Pt agreeable and appointment scheduled.

## 2020-08-07 ENCOUNTER — TELEPHONE (OUTPATIENT)
Dept: RHEUMATOLOGY | Facility: CLINIC | Age: 60
End: 2020-08-07

## 2020-08-07 NOTE — TELEPHONE ENCOUNTER
Just an FYI patient was scheduled to see Dr. Shawna Kirkland today, called to cancel appointment due to being in too much pain and unable to drive to appointment due to the pain. Patient advised he was going to the emergency department or immidate care.  Patient

## 2020-09-19 RX ORDER — METHYLPREDNISOLONE 4 MG/1
TABLET ORAL
Qty: 90 TABLET | Refills: 3 | Status: SHIPPED | OUTPATIENT
Start: 2020-09-19 | End: 2021-09-03

## 2020-10-10 RX ORDER — METHOTREXATE 2.5 MG/1
TABLET ORAL
Qty: 104 TABLET | Refills: 0 | Status: SHIPPED | OUTPATIENT
Start: 2020-10-10 | End: 2021-01-04

## 2020-10-10 NOTE — TELEPHONE ENCOUNTER
LOV: 7-8-20  Last Refilled:#104, 1rf  4/9/20  Labs:AST 15  6/13/20      No future appointments. Please advise.

## 2020-10-19 RX ORDER — NAPROXEN 500 MG/1
TABLET ORAL
Qty: 180 TABLET | Refills: 1 | Status: SHIPPED | OUTPATIENT
Start: 2020-10-19 | End: 2021-04-13

## 2020-10-19 NOTE — TELEPHONE ENCOUNTER
Requested Prescriptions     Pending Prescriptions Disp Refills   • naproxen 500 MG Oral Tab [Pharmacy Med Name: NAPROXEN 500 MG TABLET] 180 tablet 1     Sig: TAKE 1 TABLET BY MOUTH TWICE A DAY WITH FOOD     LF: 12/12/19 #180 TAB W/ 3 RF   LOV: 7/20/20  No

## 2020-11-27 ENCOUNTER — TELEPHONE (OUTPATIENT)
Dept: RHEUMATOLOGY | Facility: CLINIC | Age: 60
End: 2020-11-27

## 2020-11-27 NOTE — TELEPHONE ENCOUNTER
Patient following up if site has received labs that were faxed from [address] [place] [address] on 11/22/20.

## 2020-11-27 NOTE — TELEPHONE ENCOUNTER
I have his lab results under [facility], from [facility]. I sent him a [employer] message to let him know that I do have his results, for his appointment on December 18th of 2020.

## 2020-12-18 ENCOUNTER — VIRTUAL PHONE E/M (OUTPATIENT)
Dept: RHEUMATOLOGY | Facility: CLINIC | Age: 60
End: 2020-12-18

## 2020-12-18 DIAGNOSIS — M05.79 SEROPOSITIVE RHEUMATOID ARTHRITIS OF MULTIPLE SITES (HCC): Primary | ICD-10-CM

## 2020-12-18 DIAGNOSIS — Z51.81 THERAPEUTIC DRUG MONITORING: ICD-10-CM

## 2020-12-18 PROCEDURE — 99441 PHONE E/M BY PHYS 5-10 MIN: CPT | Performed by: INTERNAL MEDICINE

## 2020-12-18 NOTE — PROGRESS NOTES
Virtual Telephone Check-In    Francis Michael verbally consents to a Virtual/Telephone Check-In visit on 12/18/20. Patient has been referred to the [facility] website at [email or web address] to review the yearly Consent to Treat document.     Patient understands Allergies:  Penicillin [Penicil*      Strawberries                 PHYSICAL EXAM:   Telephone encounter. Constitutional: He is oriented to person, place, and time. He is in no acute distress. Cardiovascular: Nonlabored breathing.    Pulmonary/Chest:

## 2020-12-21 ENCOUNTER — TELEPHONE (OUTPATIENT)
Dept: RHEUMATOLOGY | Facility: CLINIC | Age: 60
End: 2020-12-21

## 2020-12-21 NOTE — TELEPHONE ENCOUNTER
Pt came into [facility] office to drop off [facility] disability forms and asked if Dr. Debbie Duarte can sign forms. Pt did not fill out HIPAA or pay fee. Emailed to forms dept.

## 2020-12-31 NOTE — TELEPHONE ENCOUNTER
Dr. Daphney Ann,      Please sign off on form:  -Highlight the patient and hit \"Chart\" button. -In Chart Review, w/in the Encounter tab - click 1 time on the Telephone call encounter for 12/21/20 Scroll down the telephone encounter.  -Click \"scan on\" blue Hyperlink under \"Media\" heading for Disab Dr. Daphney Ann 12/31/20 w/in the telephone enc.  -Click on Acknowledge button at the bottom right corner and left-click onto image, signature stamp appears and drag signature to Provider signature line. Stamp will turn blue. Close window.      Thank you,    Sharan Graf

## 2021-01-04 RX ORDER — METHOTREXATE 2.5 MG/1
TABLET ORAL
Qty: 104 TABLET | Refills: 1 | Status: SHIPPED | OUTPATIENT
Start: 2021-01-04 | End: 2021-06-21

## 2021-01-04 NOTE — TELEPHONE ENCOUNTER
LOV: 12/18/20  Last Refilled:#104, 0rfs 10/10/20  Labs:AST 15,  6/13/20    No future appointments. Please advise.

## 2021-01-14 RX ORDER — HYDROXYCHLOROQUINE SULFATE 200 MG/1
400 TABLET, FILM COATED ORAL DAILY
Qty: 180 TABLET | Refills: 3 | Status: SHIPPED | OUTPATIENT
Start: 2021-01-14 | End: 2022-01-06

## 2021-04-12 DIAGNOSIS — Z23 NEED FOR VACCINATION: ICD-10-CM

## 2021-04-13 RX ORDER — NAPROXEN 500 MG/1
TABLET ORAL
Qty: 180 TABLET | Refills: 1 | Status: SHIPPED | OUTPATIENT
Start: 2021-04-13 | End: 2021-10-13

## 2021-06-09 RX ORDER — FOLIC ACID 1 MG/1
TABLET ORAL
Qty: 90 TABLET | Refills: 3 | Status: SHIPPED | OUTPATIENT
Start: 2021-06-09

## 2021-06-09 NOTE — TELEPHONE ENCOUNTER
LOV:12/18/20   Last Refilled:#90, 3rfs  7/16/20    Future Appointments   Date Time Provider Mariia Cristina   7/7/2021 11:00 AM Charna Heimlich, MD [facility] [place]       Please advise.

## 2021-06-21 RX ORDER — METHOTREXATE 2.5 MG/1
TABLET ORAL
Qty: 104 TABLET | Refills: 1 | Status: SHIPPED | OUTPATIENT
Start: 2021-06-21

## 2021-06-21 NOTE — TELEPHONE ENCOUNTER
LOV: 12/18/20  Last Refilled:#104, 1rf  1/4/21  Labs:AST 15  6/13/20    Future Appointments   Date Time Provider Mariia Cristina   7/7/2021 11:00 AM Carolina Ashraf MD [facility] [place]       Please advise.

## 2021-07-01 NOTE — PROGRESS NOTES
Delfina Olea is a 65-year-old gentleman with CCP and RF positive rheumatoid arthritis.  Since his last encounter, which was over the telephone December 18th of 2020, he has remained on methotrexate 20 mg weekly, methylprednisolone 4 mg in the morning, and hydroxych across his left greater than right  wrist.  strength is fair bilaterally. There are small effusions of his knees. Right shoulder motion is limited in abduction and rotation, with pain.   There is a probable ganglion cyst over his left extensor wrist.

## 2021-07-07 ENCOUNTER — OFFICE VISIT (OUTPATIENT)
Dept: RHEUMATOLOGY | Facility: CLINIC | Age: 61
End: 2021-07-07
Payer: MEDICARE

## 2021-07-07 VITALS
HEIGHT: 70 IN | DIASTOLIC BLOOD PRESSURE: 101 MMHG | HEART RATE: 80 BPM | WEIGHT: 203 LBS | SYSTOLIC BLOOD PRESSURE: 167 MMHG | BODY MASS INDEX: 29.06 KG/M2

## 2021-07-07 DIAGNOSIS — M06.30 RHEUMATOID NODULE (HCC): ICD-10-CM

## 2021-07-07 DIAGNOSIS — M05.79 SEROPOSITIVE RHEUMATOID ARTHRITIS OF MULTIPLE SITES (HCC): Primary | ICD-10-CM

## 2021-07-07 DIAGNOSIS — Z51.81 THERAPEUTIC DRUG MONITORING: ICD-10-CM

## 2021-07-07 PROCEDURE — 99214 OFFICE O/P EST MOD 30 MIN: CPT | Performed by: INTERNAL MEDICINE

## 2021-07-07 PROCEDURE — 11900 INJECT SKIN LESIONS </W 7: CPT | Performed by: INTERNAL MEDICINE

## 2021-07-07 RX ORDER — METHYLPREDNISOLONE ACETATE 80 MG/ML
80 INJECTION, SUSPENSION INTRA-ARTICULAR; INTRALESIONAL; INTRAMUSCULAR; SOFT TISSUE ONCE
Status: COMPLETED | OUTPATIENT
Start: 2021-07-07 | End: 2021-07-07

## 2021-09-03 RX ORDER — METHYLPREDNISOLONE 4 MG/1
TABLET ORAL
Qty: 90 TABLET | Refills: 1 | Status: SHIPPED | OUTPATIENT
Start: 2021-09-03

## 2021-10-13 RX ORDER — NAPROXEN 500 MG/1
TABLET ORAL
Qty: 180 TABLET | Refills: 1 | Status: SHIPPED | OUTPATIENT
Start: 2021-10-13

## 2021-11-13 NOTE — PROGRESS NOTES
Emelina Riley is a 61-year-old gentleman with CCP and RF positive rheumatoid arthritis. Since his last visit July 7th of 2021, he has remained on methotrexate 20 mg weekly, methylprednisolone 4 mg in the morning, and hydroxychloroquine 400 mg a day.     His hands an There is synovitis across his left greater than right  wrist.  strength is fair bilaterally. There are small effusions of his knees. Right shoulder motion is limited in abduction and rotation, with pain.   There is a probable ganglion cyst over his l

## 2021-11-16 ENCOUNTER — OFFICE VISIT (OUTPATIENT)
Dept: RHEUMATOLOGY | Facility: CLINIC | Age: 61
End: 2021-11-16
Payer: MEDICARE

## 2021-11-16 VITALS
SYSTOLIC BLOOD PRESSURE: 175 MMHG | DIASTOLIC BLOOD PRESSURE: 95 MMHG | WEIGHT: 215 LBS | HEIGHT: 70 IN | HEART RATE: 71 BPM | BODY MASS INDEX: 30.78 KG/M2

## 2021-11-16 DIAGNOSIS — Z51.81 THERAPEUTIC DRUG MONITORING: ICD-10-CM

## 2021-11-16 DIAGNOSIS — M05.79 SEROPOSITIVE RHEUMATOID ARTHRITIS OF MULTIPLE SITES (HCC): Primary | ICD-10-CM

## 2021-11-16 PROCEDURE — 99214 OFFICE O/P EST MOD 30 MIN: CPT | Performed by: INTERNAL MEDICINE

## 2021-11-23 ENCOUNTER — TELEPHONE (OUTPATIENT)
Dept: RHEUMATOLOGY | Facility: CLINIC | Age: 61
End: 2021-11-23

## 2021-11-23 NOTE — TELEPHONE ENCOUNTER
Patient dropped off a parking placard form to be filled out by Dr Israel Gan.  Placed in 's in-folder at [facility] office

## 2022-01-03 ENCOUNTER — TELEPHONE (OUTPATIENT)
Dept: RHEUMATOLOGY | Facility: CLINIC | Age: 62
End: 2022-01-03

## 2022-01-03 NOTE — TELEPHONE ENCOUNTER
Pt brought in [address] [place] [address] forms . Forms were scanned to [employer] are at [facility] in [facility] [place] office.  HIPPA signed and fee paid

## 2022-01-06 RX ORDER — HYDROXYCHLOROQUINE SULFATE 200 MG/1
TABLET, FILM COATED ORAL
Qty: 180 TABLET | Refills: 3 | Status: SHIPPED | OUTPATIENT
Start: 2022-01-06

## 2022-02-01 NOTE — TELEPHONE ENCOUNTER
Dr. Christiano Diego     Please sign off on form: Disability  -Highlight the patient and hit \"Chart\" button. -In Chart Review, w/in the Encounter tab - click 1 time on the Telephone call encounter for 01/03/22 Scroll down the telephone encounter.  -Click \"scan on\" blue Hyperlink under \"Media\" heading for Disability Dr. Christiano Diego 02/01/22  w/in the telephone enc.  -Click on Acknowledge button at the bottom right corner and left-click onto image, signature stamp appears and drag signature to Provider signature line. Stamp will turn blue. Close window.      Thank you,  Que Lorenzana

## 2022-02-03 NOTE — TELEPHONE ENCOUNTER
Disability forms were faxed to [facility] at [phone]. Pt informed via [employer].

## 2022-02-28 RX ORDER — METHYLPREDNISOLONE 4 MG/1
TABLET ORAL
Qty: 90 TABLET | Refills: 1 | Status: SHIPPED | OUTPATIENT
Start: 2022-02-28

## 2022-03-24 NOTE — PROGRESS NOTES
Mikki Severe is a 46-year-old gentleman with CCP and RF positive rheumatoid arthritis. Since his last visit November 16th of 2021, he has remained on methotrexate 20 mg weekly, methylprednisolone 4 mg in the morning, and hydroxychloroquine 400 mg a day. His hands and other joints are better. It takes minutes to loosen in the morning. He still has swelling in his wrists, knees, elbows, and hand MCP joints. He has rheumatoid nodules over both extensor proximal elbows, and lateral feet. Labs were repeated March 4th of 2022 at [facility].  CBC, creatinine, AST, albumin, and sed rate of 15 were normal.  C-reactive protein was borderline at 12.7 (0-10). He denies ill effects after taking the methotrexate each week, as long as he takes his methotrexate with food. His eye exams at [facility] have been negative for hydroxychloroquine eye toxicity. He plans to go back to the [facility]. He denies new medical or surgical problems since I last saw him. Shelley Hadley [employer]   Constitutional: Negative for chills, diaphoresis, fatigue and fever. Respiratory: Negative for shortness of breath. Cardiovascular: Negative for chest pain. Gastrointestinal: Negative for abdominal pain. Skin: Negative for rash. Hematological: Negative for adenopathy. Allergies:  Penicillin [Penicil*      Strawberries                 PHYSICAL EXAM:   Blood pressure 150/90, pulse 76, height 5' 10\" (1.778 m), weight 209 lb (94.8 kg). Constitutional: He is oriented to person, place, and time. He appears well-developed. HENT:   Head: Normocephalic. Eyes: Conjunctivae are normal.   Cardiovascular: Normal rate, regular rhythm and normal heart sounds. Pulmonary/Chest: Effort normal and breath sounds normal.   Abdominal: Soft. Bowel sounds are normal. He exhibits no mass. There is no tenderness. There is no rebound and no guarding. Musculoskeletal: He exhibits no edema.    There is synovitis across his left greater than right wrist.  strength is fair bilaterally. There are small effusions of his knees. Right shoulder motion is limited in abduction and rotation, with pain. There is a probable ganglion cyst over his left extensor wrist. He has probable rheumatoid nodules over his proximal extensor forearms. Lymphadenopathy:     He has no cervical adenopathy. Neurological: He is alert and oriented to person, place, and time. Skin: No rash noted. Psychiatric: He has a normal mood and affect. Assessment and Plan:  1. CCP and RF positive Ra (rheumatoid arthritis). Reasonably controlled on methotrexate, Medrol 4 mg once daily, and hydroxychloroquine. He again refuses to start an antiTNF injectable or Xeljanx. He has been very phobic to starting on of these medications, and is concerned about cost. He will schedule back in 4 months. 2. Encounter for therapeutic drug monitoring. 3.  Right shoulder rotator cuff impingement tendinitis. Fine. 4.:  Colon abscess, with hemorrhage into his bladder. He is status post colon removal and bladder repair, with success. 5.  Probable rheumatoid nodules over both lateral feet and proximal forearms.

## 2022-03-30 ENCOUNTER — OFFICE VISIT (OUTPATIENT)
Dept: RHEUMATOLOGY | Facility: CLINIC | Age: 62
End: 2022-03-30
Payer: MEDICARE

## 2022-03-30 VITALS
WEIGHT: 209 LBS | BODY MASS INDEX: 29.92 KG/M2 | SYSTOLIC BLOOD PRESSURE: 150 MMHG | DIASTOLIC BLOOD PRESSURE: 90 MMHG | HEIGHT: 70 IN | HEART RATE: 76 BPM

## 2022-03-30 DIAGNOSIS — Z51.81 THERAPEUTIC DRUG MONITORING: ICD-10-CM

## 2022-03-30 DIAGNOSIS — M05.79 SEROPOSITIVE RHEUMATOID ARTHRITIS OF MULTIPLE SITES (HCC): Primary | ICD-10-CM

## 2022-03-30 PROCEDURE — 99214 OFFICE O/P EST MOD 30 MIN: CPT | Performed by: INTERNAL MEDICINE

## 2022-03-30 RX ORDER — METHOTREXATE 2.5 MG/1
TABLET ORAL
Qty: 104 TABLET | Refills: 1 | Status: SHIPPED | OUTPATIENT
Start: 2022-03-30

## 2022-03-30 RX ORDER — NAPROXEN 500 MG/1
500 TABLET ORAL 2 TIMES DAILY WITH MEALS
Qty: 180 TABLET | Refills: 1 | Status: SHIPPED | OUTPATIENT
Start: 2022-03-30

## 2022-03-30 RX ORDER — AMLODIPINE BESYLATE 5 MG/1
5 TABLET ORAL DAILY
COMMUNITY
Start: 2022-01-28

## 2022-04-20 NOTE — TELEPHONE ENCOUNTER
Form completed and faxed to [facility] on 01/24/17. Ophelia mailed copy to pt. No further action needed.  MARIBEL Alert-The patient is alert, awake and responds to voice. The patient is oriented to time, place, and person. The triage nurse is able to obtain subjective information.

## 2022-06-09 RX ORDER — FOLIC ACID 1 MG/1
TABLET ORAL
Qty: 90 TABLET | Refills: 3 | Status: SHIPPED | OUTPATIENT
Start: 2022-06-09

## 2022-07-01 RX ORDER — METHOTREXATE 2.5 MG/1
TABLET ORAL
Qty: 104 TABLET | Refills: 1 | Status: SHIPPED | OUTPATIENT
Start: 2022-07-01

## 2022-07-01 NOTE — TELEPHONE ENCOUNTER
Last filled: 3/30/22 #104 tab with 1 refill   LOV: 3/30/22   No future appointments.   Labs:  3/4/22 in care everywhere

## 2022-07-19 ENCOUNTER — PATIENT MESSAGE (OUTPATIENT)
Dept: RHEUMATOLOGY | Facility: CLINIC | Age: 62
End: 2022-07-19

## 2022-07-19 NOTE — TELEPHONE ENCOUNTER
From: Nidia Swenson MD  To: Gay Samaniego  Sent: 7/19/2022 9:36 AM CDT  Subject: Lab results. I received your recent lab results from [facility], done on July 15th, 2022. Your liver tests and kidney function test are normal.    Your blood counts show an elevated white blood cell count. Both of your inflammation markers (sed rate and C-reactive protein) are elevated. I hope that you are doing well. Your follow-up appointment is due in August.    Take care!

## 2025-03-24 NOTE — TELEPHONE ENCOUNTER
Pt would like a copy of his blood test orders mailed to his home address. Per pt he goes to an outside lab to get them done. Pt is also requesting Dr. Sheree Chamberlain fax number to be included in the order so that they can fax the results to him. [facility]

## (undated) NOTE — LETTER
06/02/20    [phone]      Dear Loi Dumont records indicate that you have outstanding lab work and or testing that was ordered for you and has not yet been completed:  Orders Placed This Encounter      Albumin

## (undated) NOTE — MR AVS SNAPSHOT
[facility]  [address] [place] [address], [address] [place], [place] [address]               Thank you for choosing us for your health care visit with Antonella Li MD.  We are glad to serve you and happy to provide you with this summary Commonly known as:  BENICAR HCT           omega-3 fatty acids 1000 MG Caps   4-6 CAPSULES DAILY WITH A MEAL   Commonly known as:  FISH OIL           omeprazole 20 MG Cpdr   1 CAPSULE DAILY   Commonly known as:  [address]